# Patient Record
Sex: FEMALE | Race: BLACK OR AFRICAN AMERICAN
[De-identification: names, ages, dates, MRNs, and addresses within clinical notes are randomized per-mention and may not be internally consistent; named-entity substitution may affect disease eponyms.]

---

## 2018-02-21 ENCOUNTER — HOSPITAL ENCOUNTER (EMERGENCY)
Dept: HOSPITAL 62 - ER | Age: 33
Discharge: HOME | End: 2018-02-21
Payer: SELF-PAY

## 2018-02-21 VITALS — SYSTOLIC BLOOD PRESSURE: 138 MMHG | DIASTOLIC BLOOD PRESSURE: 97 MMHG

## 2018-02-21 DIAGNOSIS — Z88.1: ICD-10-CM

## 2018-02-21 DIAGNOSIS — K08.89: ICD-10-CM

## 2018-02-21 DIAGNOSIS — J34.89: ICD-10-CM

## 2018-02-21 DIAGNOSIS — K04.7: Primary | ICD-10-CM

## 2018-02-21 DIAGNOSIS — R03.0: ICD-10-CM

## 2018-02-21 PROCEDURE — 84703 CHORIONIC GONADOTROPIN ASSAY: CPT

## 2018-02-21 PROCEDURE — 99283 EMERGENCY DEPT VISIT LOW MDM: CPT

## 2018-02-21 PROCEDURE — 36415 COLL VENOUS BLD VENIPUNCTURE: CPT

## 2018-02-21 PROCEDURE — 96374 THER/PROPH/DIAG INJ IV PUSH: CPT

## 2018-02-21 PROCEDURE — 96372 THER/PROPH/DIAG INJ SC/IM: CPT

## 2018-02-21 NOTE — ER DOCUMENT REPORT
ED ENT





- General


Chief Complaint: Sore Throat


Stated Complaint: SORE THROAT


Time Seen by Provider: 02/21/18 15:03


Notes: 





33 yo female c/o swelling and pain to right face and jaw.  throat feels 

swollen.  symptoms x 2 days.  pt does report a broken upper right rear molar 

that has been painful.  no fever, no n.v.


TRAVEL OUTSIDE OF THE [place] IN LAST 30 DAYS: Yes


COUNTRY TRAVELED TO/FROM: [facility]





- \[facility]\""


Onset: Yesterday


Onset/Duration: Gradual, Worse


Associated symptoms: Broken tooth, Dental pain, Jaw pain.  denies: Drooling, 

Fever, Stiff neck


Similar symptoms previously: Yes


Recently seen / treated by doctor: No





- Related Data


Allergies/Adverse Reactions: 


 





sulfamethoxazole [From Bactrim DS] Allergy (Verified 02/21/18 12:58)


 


trimethoprim [From Bactrim DS] Allergy (Verified 02/21/18 12:58)


 











Past Medical History





- General


Information source: Patient





- Social History


Smoking Status: Never Smoker


Chew tobacco use (# tins/day): No


Frequency of alcohol use: None


Drug Abuse: None


Lives with: Family


Family History: Arthritis, CAD, CVA, DM, Hyperlipidemia, Hypertension, 

Malignancy, Thyroid Disfunction


Patient has suicidal ideation: No


Patient has homicidal ideation: No


Pulmonary Medical History: Reports: Hx Asthma - as a child


Renal/ Medical History: Reports: Hx Ovarian Cysts.  Denies: Hx Peritoneal 

Dialysis


GI Medical History: Reports: Hx Gastroesophageal Reflux Disease, Hx Ulcer


Musculoskeltal Medical History: Reports Hx Musculoskeletal Deformity


Skin Medical History: Reports Hx Cellulitis





- Immunizations


Immunizations up to date: No


Hx Diphtheria, Pertussis, Tetanus Vaccination: Yes





Review of Systems





- Review of Systems


Constitutional: No symptoms reported


EENT: See HPI


Cardiovascular: No symptoms reported


Respiratory: No symptoms reported


Gastrointestinal: No symptoms reported


Genitourinary: No symptoms reported


Female Genitourinary: No symptoms reported


Musculoskeletal: No symptoms reported


Skin: No symptoms reported


Hematologic/Lymphatic: No symptoms reported


Neurological/Psychological: No symptoms reported





Physical Exam





- Vital signs


Vitals: 


 











Temp Pulse Resp BP Pulse Ox


 


 99.1 F   102 H  20   162/111 H  98 


 


 02/21/18 13:02  02/21/18 13:02  02/21/18 13:02  02/21/18 13:02  02/21/18 13:02











Interpretation: Normal





- General


General appearance: Alert


In distress: None - uncomfortable





- HEENT


Head: Normocephalic, Atraumatic


Eyes: Normal


Conjunctiva: Normal


Pupils: PERRL


Ears: Normal


Tympanic membrane: Normal


Sinus: Swelling - right frontal sinus tender and swollen, Tenderness


Mouth/Lips: Normal


Mucous membranes: Normal, Moist


Teeth diagram: 


  __________________________














  __________________________





 1 - pain, no abscess appreciated





Pharynx: Normal.  No: Erythema, Potential airway comprom.


Neck: Normal, Supple





- Respiratory


Respiratory status: No respiratory distress


Chest status: Nontender


Breath sounds: Normal


Chest palpation: Normal





- Cardiovascular


Rhythm: Regular


Heart sounds: Normal auscultation


Murmur: No





- Abdominal


Inspection: Normal


Distension: No distension


Bowel sounds: Normal


Tenderness: Nontender


Organomegaly: No organomegaly





- Back


Back: Normal, Nontender





- Extremities


General upper extremity: Normal inspection, Nontender, Normal color, Normal ROM

, Normal temperature


General lower extremity: Normal inspection, Nontender, Normal color, Normal ROM

, Normal temperature, Normal weight bearing.  No: Vishnu's sign





- Neurological


Neuro grossly intact: Yes


Cognition: Normal


Orientation: AAOx4


[place]arsh Coma Scale Eye Opening: Spontaneous


[place]arsh Coma Scale Verbal: Oriented


[place] Coma Scale Motor: Obeys Commands


[place] Coma Scale Total: 15


Speech: Normal


Motor strength normal: LUE, RUE, LLE, RLE


Sensory: Normal





- Psychological


Associated symptoms: Normal affect, Normal mood





- Skin


Skin Temperature: Warm


Skin Moisture: Dry


Skin Color: Normal





Course





- Re-evaluation


Re-evalutation: 





02/21/18 16:19


BP noted to be elevated.  pt denies hx/o HTN but admits BP rises in response to 

pain.  pt denies headache, dizziness or chest pain.  no s/s hypertensive crisis


02/21/18 17:20


pt reports some relief after meds.  able to speak and swallow without 

difficulty.  no s/s peritonsillar abscess of mini's.  home care, PCM follow 

up and ED return precautions discussed.  pt agreeable with plan and stable for 

discharge





- Vital Signs


Vital signs: 


 











Temp Pulse Resp BP Pulse Ox


 


 99.1 F   102 H  20   162/111 H  98 


 


 02/21/18 13:02  02/21/18 13:02  02/21/18 13:02  02/21/18 13:02  02/21/18 13:02














Discharge





- Discharge


Clinical Impression: 


 Dental infection





Condition: Stable


Disposition: HOME, SELF-CARE


Instructions:  Oral Narcotic Medication ([facility]), Penicillin V K ([facility]), Steroid 

Medication Injection, Steroid Medication, Toradol Injection ([facility])


Additional Instructions: 


Take meds as prescribed


encourage fluids


follow up primary care if symptoms persist


return to ER for any worsening


Prescriptions: 


Hydrocodone/Acetaminophen [Norco 5-325 Tablet] 1 - 2 tab PO ASDIR PRN #15 tab


 PRN Reason: 


Penicillin V Potassium [Penicillin Vk 500 mg Tablet] 500 mg PO BID #20 tablet


Forms:  Elevated Blood Pressure

## 2019-10-04 ENCOUNTER — HOSPITAL ENCOUNTER (OUTPATIENT)
Dept: HOSPITAL 62 - CCC | Age: 34
End: 2019-10-04
Payer: COMMERCIAL

## 2019-10-04 DIAGNOSIS — R60.9: ICD-10-CM

## 2019-10-04 DIAGNOSIS — R53.83: Primary | ICD-10-CM

## 2019-10-04 LAB
ADD MANUAL DIFF: NO
ALBUMIN SERPL-MCNC: 3.9 G/DL (ref 3.5–5)
ALP SERPL-CCNC: 72 U/L (ref 38–126)
ANION GAP SERPL CALC-SCNC: 8 MMOL/L (ref 5–19)
APPEARANCE UR: CLEAR
APTT PPP: YELLOW S
AST SERPL-CCNC: 20 U/L (ref 14–36)
BASOPHILS # BLD AUTO: 0 10^3/UL (ref 0–0.2)
BASOPHILS NFR BLD AUTO: 0.6 % (ref 0–2)
BILIRUB DIRECT SERPL-MCNC: 0.1 MG/DL (ref 0–0.4)
BILIRUB SERPL-MCNC: 0.5 MG/DL (ref 0.2–1.3)
BILIRUB UR QL STRIP: NEGATIVE
BUN SERPL-MCNC: 8 MG/DL (ref 7–20)
CALCIUM: 9.2 MG/DL (ref 8.4–10.2)
CHLORIDE SERPL-SCNC: 103 MMOL/L (ref 98–107)
CO2 SERPL-SCNC: 26 MMOL/L (ref 22–30)
EOSINOPHIL # BLD AUTO: 0.1 10^3/UL (ref 0–0.6)
EOSINOPHIL NFR BLD AUTO: 1.2 % (ref 0–6)
ERYTHROCYTE [DISTWIDTH] IN BLOOD BY AUTOMATED COUNT: 13.9 % (ref 11.5–14)
GLUCOSE SERPL-MCNC: 97 MG/DL (ref 75–110)
GLUCOSE UR STRIP-MCNC: NEGATIVE MG/DL
HCT VFR BLD CALC: 34.4 % (ref 36–47)
HGB BLD-MCNC: 11.4 G/DL (ref 12–15.5)
KETONES UR STRIP-MCNC: NEGATIVE MG/DL
LYMPHOCYTES # BLD AUTO: 1.9 10^3/UL (ref 0.5–4.7)
LYMPHOCYTES NFR BLD AUTO: 28.1 % (ref 13–45)
MCH RBC QN AUTO: 26.9 PG (ref 27–33.4)
MCHC RBC AUTO-ENTMCNC: 33.2 G/DL (ref 32–36)
MCV RBC AUTO: 81 FL (ref 80–97)
MONOCYTES # BLD AUTO: 0.5 10^3/UL (ref 0.1–1.4)
MONOCYTES NFR BLD AUTO: 7.2 % (ref 3–13)
NEUTROPHILS # BLD AUTO: 4.2 10^3/UL (ref 1.7–8.2)
NEUTS SEG NFR BLD AUTO: 62.9 % (ref 42–78)
NITRITE UR QL STRIP: NEGATIVE
PH UR STRIP: 7 [PH] (ref 5–9)
PLATELET # BLD: 387 10^3/UL (ref 150–450)
POTASSIUM SERPL-SCNC: 4 MMOL/L (ref 3.6–5)
PROT SERPL-MCNC: 7.5 G/DL (ref 6.3–8.2)
PROT UR STRIP-MCNC: NEGATIVE MG/DL
RBC # BLD AUTO: 4.25 10^6/UL (ref 3.72–5.28)
SP GR UR STRIP: 1.02
TOTAL CELLS COUNTED % (AUTO): 100 %
UROBILINOGEN UR-MCNC: NEGATIVE MG/DL (ref ?–2)
WBC # BLD AUTO: 6.6 10^3/UL (ref 4–10.5)

## 2019-10-04 PROCEDURE — 86038 ANTINUCLEAR ANTIBODIES: CPT

## 2019-10-04 PROCEDURE — 80053 COMPREHEN METABOLIC PANEL: CPT

## 2019-10-04 PROCEDURE — 36415 COLL VENOUS BLD VENIPUNCTURE: CPT

## 2019-10-04 PROCEDURE — 85025 COMPLETE CBC W/AUTO DIFF WBC: CPT

## 2019-10-04 PROCEDURE — 84443 ASSAY THYROID STIM HORMONE: CPT

## 2019-10-04 PROCEDURE — 81001 URINALYSIS AUTO W/SCOPE: CPT

## 2019-11-25 ENCOUNTER — HOSPITAL ENCOUNTER (EMERGENCY)
Dept: HOSPITAL 62 - ER | Age: 34
Discharge: HOME | End: 2019-11-25
Payer: SELF-PAY

## 2019-11-25 VITALS — DIASTOLIC BLOOD PRESSURE: 104 MMHG | SYSTOLIC BLOOD PRESSURE: 146 MMHG

## 2019-11-25 DIAGNOSIS — M79.602: Primary | ICD-10-CM

## 2019-11-25 PROCEDURE — 99283 EMERGENCY DEPT VISIT LOW MDM: CPT

## 2019-11-25 NOTE — ER DOCUMENT REPORT
HPI





- HPI


Patient complains to provider of: left elbow pain


Time Seen by Provider: 11/25/19 15:26


Onset: Other - 1 mo


Onset/Duration: Persistent


Quality of pain: Achy


Pain Level: 3


Context: 





Patient states she was donating plasma 1 month ago and that the technician had 

difficulty in locating her vein.  Patient states that the technician had to 

continually reposition the needle while it was in her arm.  Patient states she 

immediately started to have arm pain.  Patient states that since then she has 

had persistent antecubital pain that is worse when she flexes the arm.  Patient 

denies any fever or swelling.  Patient saw her primary doctor about this 

complaint previously been not have any additional testing ordered.


Associated Symptoms: Other - Left antecubital tenderness.  denies: Fever, 

Weakness


Exacerbated by: Movement


Relieved by: Remaining still


Similar symptoms previously: No


Recently seen / treated by doctor: Yes





- ROS


ROS below otherwise negative: Yes


Systems Reviewed and Negative: Yes All other systems reviewed and negative





- CONSTITUTIONAL


Constitutional: DENIES: Fever, Chills





- NEURO


Neurology: DENIES: Weakness





- REPRODUCTIVE


Reproductive: DENIES: Pregnant:





- MUSCULOSKELETAL


Musculoskeletal: REPORTS: Extremity pain.  DENIES: Swelling





- DERM


Skin Color: Normal


Skin Problems: None





Past Medical History





- General


Information source: Patient





- Social History


Smoking Status: Never Smoker


Frequency of alcohol use: None


Drug Abuse: None


Occupation: Insurance sales


Family History: Arthritis, CAD, CVA, DM, Hyperlipidemia, Hypertension, 

Malignancy, Thyroid Disfunction


Patient has suicidal ideation: No


Patient has homicidal ideation: No


Pulmonary Medical History: Reports: Hx Asthma - as a child


Renal/ Medical History: Reports: Hx Ovarian Cysts.  Denies: Hx Peritoneal 

Dialysis


GI Medical History: Reports: Hx Gastroesophageal Reflux Disease, Hx Ulcer


Musculoskeletal Medical History: Reports Hx Musculoskeletal Deformity


Skin Medical History: Reports Hx Cellulitis


Past Surgical History: Reports: Other - Cyst removal





- Immunizations


Immunizations up to date: No


Hx Diphtheria, Pertussis, Tetanus Vaccination: Yes





Vertical Provider Document





- CONSTITUTIONAL


Agree With Documented VS: Yes


Exam Limitations: No Limitations


General Appearance: WD/WN, No Apparent Distress





- INFECTION CONTROL


TRAVEL OUTSIDE OF THE [place] IN LAST 30 DAYS: No





- HEENT


HEENT: Atraumatic, Normocephalic





- NECK


Neck: Normal Inspection, Supple





- RESPIRATORY


Respiratory: Breath Sounds Normal, No Respiratory Distress





- CARDIOVASCULAR


Cardiovascular: Regular Rate, Regular Rhythm


Pulses: Normal: Radial





- MUSCULOSKELETAL/EXTREMETIES


Musculoskeletal/Extremeties: MAEW, FROM, Tender - Tenderness over left 

antecubital area and over left olecranon process with flexion, no edema, no 

ecchymosis, no calor, Edema





- NEURO


Level of Consciousness: Awake, Alert, Appropriate


Motor/Sensory: No Motor Deficit


Notes: 





No median, radial or ulnar nerve deficit





- DERM


Integumentary: Warm, Dry





Course





- Re-evaluation


Re-evalutation: 





11/25/19 15:47


Patient with tenderness to left antecubital area with flexion.  Symptoms started

after patient reports that she was donating plasma and they had difficulty in 

locating her vein.  Patient without any obvious nerve deficit although does have

pain to the antecubital area that will radiate to the volar aspect of the left 

forearm.  No concern for septic arthritis, MI, or CVA.  Patient encouraged to 

follow-up with primary doctor or neurology for further evaluation.


11/25/19 15:56








- Vital Signs


Vital signs: 


                                        











Temp Pulse Resp BP Pulse Ox


 


 98.4 F   80   18   161/106 H  100 


 


 11/25/19 15:12  11/25/19 15:12  11/25/19 15:12  11/25/19 15:12  11/25/19 15:12














Discharge





- Discharge


Clinical Impression: 


 Left arm pain





Condition: Stable


Disposition: HOME, SELF-CARE


Instructions:  Anti-Inflammatory Medication ([facility])


Additional Instructions: 


Return immediately for any new or worsening symptoms: Fever, swelling, redness, 

weakness, any new or concerning symptoms





Followup with your primary care provider, call tomorrow to make a followup 

appointment





Follow-up with neurology for further evaluation.


Prescriptions: 


Naproxen [Naprosyn 250 Nmg Tablet] 1 tab PO BID #14 tablet


Referrals: 


[facility] [Primary Care Provider] - Follow up as needed


[facility] [NO LOCAL MD] - Follow up in 3-5 days

## 2020-01-07 ENCOUNTER — HOSPITAL ENCOUNTER (OUTPATIENT)
Dept: HOSPITAL 62 - RAD | Age: 35
End: 2020-01-07
Attending: INTERNAL MEDICINE
Payer: COMMERCIAL

## 2020-01-07 DIAGNOSIS — R19.07: ICD-10-CM

## 2020-01-07 DIAGNOSIS — R10.2: ICD-10-CM

## 2020-01-07 DIAGNOSIS — D25.9: Primary | ICD-10-CM

## 2020-01-07 PROCEDURE — 93976 VASCULAR STUDY: CPT

## 2020-01-07 PROCEDURE — 76830 TRANSVAGINAL US NON-OB: CPT

## 2020-01-07 NOTE — RADIOLOGY REPORT (SQ)
EXAM DESCRIPTION:  U/S NON OB PEL TV W/DOPPLER



COMPLETED DATE/TIME:  1/7/2020 3:36 pm



REASON FOR STUDY:  (R10.2)PELVIC AND PERINEAL PAIN R10.2  PELVIC AND PERINEAL PAIN



COMPARISON:  3/10/2012.



TECHNIQUE:  Dynamic and static grayscale images acquired of the pelvis via transvaginal approach and 
recorded on PACS. Additional selected color Doppler and spectral images recorded.



LIMITATIONS:  None.



FINDINGS:  UTERUS: Contour normal.  4.6 x 5.2 cm solid mass in the posterior fundus.

ENDOMETRIAL STRIPE: No focal or generalized thickening. No masses.

CERVIX: No nabothian cysts.

RIGHT OVARY AND DOPPLER: Normal size. No worrisome masses. Normal arterial vascular flow without evid
ence for torsion.

LEFT OVARY AND DOPPLER: Ovary not visualized.

FREE FLUID: None noted.

OTHER: No other significant finding.

MEASUREMENTS:

UTERUS: 4.8 x 6.7 x 7.8 cm.

ENDOMETRIAL STRIPE: 6 mm.

RIGHT OVARY: 2.0 x 2.2 x 3.0 cm.

LEFT OVARY: Not visualized.



IMPRESSION:  5.2 CM FIBROID IN THE FUNDUS OF THE UTERUS.



TECHNICAL DOCUMENTATION:  JOB ID:  [id number]

 2011 [employer]- All Rights Reserved                          Rev-[id number]



Reading location - IP/workstation name: [email or web address]